# Patient Record
Sex: FEMALE | Race: WHITE | NOT HISPANIC OR LATINO
[De-identification: names, ages, dates, MRNs, and addresses within clinical notes are randomized per-mention and may not be internally consistent; named-entity substitution may affect disease eponyms.]

---

## 2020-11-11 ENCOUNTER — TRANSCRIPTION ENCOUNTER (OUTPATIENT)
Age: 50
End: 2020-11-11

## 2022-06-15 ENCOUNTER — OUTPATIENT (OUTPATIENT)
Dept: OUTPATIENT SERVICES | Facility: HOSPITAL | Age: 52
LOS: 1 days | End: 2022-06-15
Payer: COMMERCIAL

## 2022-06-15 VITALS — WEIGHT: 255.96 LBS | HEIGHT: 70 IN

## 2022-06-15 DIAGNOSIS — E66.01 MORBID (SEVERE) OBESITY DUE TO EXCESS CALORIES: ICD-10-CM

## 2022-06-15 DIAGNOSIS — Z98.890 OTHER SPECIFIED POSTPROCEDURAL STATES: Chronic | ICD-10-CM

## 2022-06-15 DIAGNOSIS — Z01.818 ENCOUNTER FOR OTHER PREPROCEDURAL EXAMINATION: ICD-10-CM

## 2022-06-15 DIAGNOSIS — G47.33 OBSTRUCTIVE SLEEP APNEA (ADULT) (PEDIATRIC): ICD-10-CM

## 2022-06-15 LAB
ANION GAP SERPL CALC-SCNC: 8 MMOL/L — SIGNIFICANT CHANGE UP (ref 5–17)
APPEARANCE UR: CLEAR — SIGNIFICANT CHANGE UP
APTT BLD: 33.5 SEC — SIGNIFICANT CHANGE UP (ref 27.5–35.5)
BASOPHILS # BLD AUTO: 0.08 K/UL — SIGNIFICANT CHANGE UP (ref 0–0.2)
BASOPHILS NFR BLD AUTO: 1 % — SIGNIFICANT CHANGE UP (ref 0–2)
BILIRUB UR-MCNC: NEGATIVE — SIGNIFICANT CHANGE UP
BLOOD GAS SOURCE: SIGNIFICANT CHANGE UP
BUN SERPL-MCNC: 13 MG/DL — SIGNIFICANT CHANGE UP (ref 7–23)
CALCIUM SERPL-MCNC: 9.9 MG/DL — SIGNIFICANT CHANGE UP (ref 8.5–10.1)
CHLORIDE SERPL-SCNC: 105 MMOL/L — SIGNIFICANT CHANGE UP (ref 96–108)
CO2 SERPL-SCNC: 26 MMOL/L — SIGNIFICANT CHANGE UP (ref 22–31)
COHGB MFR BLDV: 1.9 % — SIGNIFICANT CHANGE UP
COLOR SPEC: YELLOW — SIGNIFICANT CHANGE UP
CREAT SERPL-MCNC: 0.94 MG/DL — SIGNIFICANT CHANGE UP (ref 0.5–1.3)
DIFF PNL FLD: NEGATIVE — SIGNIFICANT CHANGE UP
EGFR: 73 ML/MIN/1.73M2 — SIGNIFICANT CHANGE UP
EOSINOPHIL # BLD AUTO: 0.36 K/UL — SIGNIFICANT CHANGE UP (ref 0–0.5)
EOSINOPHIL NFR BLD AUTO: 4.5 % — SIGNIFICANT CHANGE UP (ref 0–6)
GLUCOSE SERPL-MCNC: 91 MG/DL — SIGNIFICANT CHANGE UP (ref 70–99)
GLUCOSE UR QL: NEGATIVE — SIGNIFICANT CHANGE UP
HCT VFR BLD CALC: 43.5 % — SIGNIFICANT CHANGE UP (ref 34.5–45)
HGB BLD-MCNC: 14.5 G/DL — SIGNIFICANT CHANGE UP (ref 11.5–15.5)
IMM GRANULOCYTES NFR BLD AUTO: 0.2 % — SIGNIFICANT CHANGE UP (ref 0–1.5)
INR BLD: 1.14 RATIO — SIGNIFICANT CHANGE UP (ref 0.88–1.16)
KETONES UR-MCNC: ABNORMAL
LEUKOCYTE ESTERASE UR-ACNC: NEGATIVE — SIGNIFICANT CHANGE UP
LYMPHOCYTES # BLD AUTO: 2.1 K/UL — SIGNIFICANT CHANGE UP (ref 1–3.3)
LYMPHOCYTES # BLD AUTO: 26.1 % — SIGNIFICANT CHANGE UP (ref 13–44)
MCHC RBC-ENTMCNC: 29.2 PG — SIGNIFICANT CHANGE UP (ref 27–34)
MCHC RBC-ENTMCNC: 33.3 GM/DL — SIGNIFICANT CHANGE UP (ref 32–36)
MCV RBC AUTO: 87.7 FL — SIGNIFICANT CHANGE UP (ref 80–100)
MONOCYTES # BLD AUTO: 0.69 K/UL — SIGNIFICANT CHANGE UP (ref 0–0.9)
MONOCYTES NFR BLD AUTO: 8.6 % — SIGNIFICANT CHANGE UP (ref 2–14)
NEUTROPHILS # BLD AUTO: 4.81 K/UL — SIGNIFICANT CHANGE UP (ref 1.8–7.4)
NEUTROPHILS NFR BLD AUTO: 59.6 % — SIGNIFICANT CHANGE UP (ref 43–77)
NITRITE UR-MCNC: NEGATIVE — SIGNIFICANT CHANGE UP
PH UR: 6 — SIGNIFICANT CHANGE UP (ref 5–8)
PLATELET # BLD AUTO: 324 K/UL — SIGNIFICANT CHANGE UP (ref 150–400)
POTASSIUM SERPL-MCNC: 4.6 MMOL/L — SIGNIFICANT CHANGE UP (ref 3.5–5.3)
POTASSIUM SERPL-SCNC: 4.6 MMOL/L — SIGNIFICANT CHANGE UP (ref 3.5–5.3)
PROT UR-MCNC: NEGATIVE — SIGNIFICANT CHANGE UP
PROTHROM AB SERPL-ACNC: 13.3 SEC — SIGNIFICANT CHANGE UP (ref 10.5–13.4)
RBC # BLD: 4.96 M/UL — SIGNIFICANT CHANGE UP (ref 3.8–5.2)
RBC # FLD: 13.2 % — SIGNIFICANT CHANGE UP (ref 10.3–14.5)
SODIUM SERPL-SCNC: 139 MMOL/L — SIGNIFICANT CHANGE UP (ref 135–145)
SP GR SPEC: 1 — LOW (ref 1.01–1.02)
UROBILINOGEN FLD QL: NEGATIVE — SIGNIFICANT CHANGE UP
WBC # BLD: 8.06 K/UL — SIGNIFICANT CHANGE UP (ref 3.8–10.5)
WBC # FLD AUTO: 8.06 K/UL — SIGNIFICANT CHANGE UP (ref 3.8–10.5)

## 2022-06-15 PROCEDURE — 85025 COMPLETE CBC W/AUTO DIFF WBC: CPT

## 2022-06-15 PROCEDURE — 93010 ELECTROCARDIOGRAM REPORT: CPT

## 2022-06-15 PROCEDURE — G0463: CPT | Mod: 25

## 2022-06-15 PROCEDURE — 82375 ASSAY CARBOXYHB QUANT: CPT

## 2022-06-15 PROCEDURE — 80048 BASIC METABOLIC PNL TOTAL CA: CPT

## 2022-06-15 PROCEDURE — 36415 COLL VENOUS BLD VENIPUNCTURE: CPT

## 2022-06-15 PROCEDURE — 86901 BLOOD TYPING SEROLOGIC RH(D): CPT

## 2022-06-15 PROCEDURE — 71046 X-RAY EXAM CHEST 2 VIEWS: CPT

## 2022-06-15 PROCEDURE — 85730 THROMBOPLASTIN TIME PARTIAL: CPT

## 2022-06-15 PROCEDURE — 85610 PROTHROMBIN TIME: CPT

## 2022-06-15 PROCEDURE — 93005 ELECTROCARDIOGRAM TRACING: CPT

## 2022-06-15 PROCEDURE — 81003 URINALYSIS AUTO W/O SCOPE: CPT

## 2022-06-15 PROCEDURE — 86900 BLOOD TYPING SEROLOGIC ABO: CPT

## 2022-06-15 PROCEDURE — 82040 ASSAY OF SERUM ALBUMIN: CPT

## 2022-06-15 PROCEDURE — 86850 RBC ANTIBODY SCREEN: CPT

## 2022-06-15 PROCEDURE — 71046 X-RAY EXAM CHEST 2 VIEWS: CPT | Mod: 26

## 2022-06-15 NOTE — H&P PST ADULT - ASSESSMENT
53 y/o female presents to [facility] for scheduled laparoscopic gastric sleeve on 22 for weight loss.     CAPRINI SCORE    AGE RELATED RISK FACTORS                                                       MOBILITY RELATED FACTORS  [x ] Age 41-60 years                                            (1 Point)                  [ ] Bed rest                                                        (1 Point)  [ ] Age: 61-74 years                                           (2 Points)                [ ] Plaster cast                                                   (2 Points)  [ ] Age= 75 years                                              (3 Points)                 [ ] Bed bound for more than 72 hours                   (2 Points)    DISEASE RELATED RISK FACTORS                                               GENDER SPECIFIC FACTORS  [ ] Edema in the lower extremities                       (1 Point)                  [ ] Pregnancy                                                     (1 Point)  [ ] Varicose veins                                               (1 Point)                  [ ] Post-partum < 6 weeks                                   (1 Point)             [x ] BMI > 25 Kg/m2                                            (1 Point)                  [ ] Hormonal therapy  or oral contraception            (1 Point)                 [ ] Sepsis (in the previous month)                        (1 Point)                  [ ] History of pregnancy complications  [ ] Pneumonia or serious lung disease                                               [ ] Unexplained or recurrent                       (1 Point)           (in the previous month)                               (1 Point)  [ ] Abnormal pulmonary function test                     (1 Point)                 SURGERY RELATED RISK FACTORS  [ ] Acute myocardial infarction                              (1 Point)                 [ ]  Section                                            (1 Point)  [ ] Congestive heart failure (in the previous month)  (1 Point)                 [ ] Minor surgery                                                 (1 Point)   [ ] Inflammatory bowel disease                             (1 Point)                 [ ] Arthroscopic surgery                                        (2 Points)  [ ] Central venous access                                    (2 Points)                [x ] General surgery lasting more than 45 minutes   (2 Points)       [ ] Stroke (in the previous month)                          (5 Points)               [ ] Elective arthroplasty                                        (5 Points)            [ ] malignancy                                                             (2 points)                                                                                                                                 HEMATOLOGY RELATED FACTORS                                                 TRAUMA RELATED RISK FACTORS  [ ] Prior episodes of VTE                                     (3 Points)                 [ ] Fracture of the hip, pelvis, or leg                       (5 Points)  [ ] Positive family history for VTE                         (3 Points)                 [ ] Acute spinal cord injury (in the previous month)  (5 Points)  [ ] Prothrombin 26728 A                                      (3 Points)                 [ ] Paralysis  (less than 1 month)                          (5 Points)  [ ] Factor V Leiden                                             (3 Points)                 [ ] Multiple Trauma within 1 month                         (5 Points)  [ ] Lupus anticoagulants                                     (3 Points)                                                           [ ] Anticardiolipin antibodies                                (3 Points)                                                       [ ] High homocysteine in the blood                      (3 Points)                                             [ ] Other congenital or acquired thrombophilia       (3 Points)                                                [ ] Heparin induced thrombocytopenia                  (3 Points)                                          Total Score [       4   ]    The Caprini score indicates this patient is at risk for a VTE event (score 3-5).  Most surgical patients in this group would benefit from pharmacologic prophylaxis.  The surgical team will determine the balance between VTE risk and bleeding risk

## 2022-06-15 NOTE — H&P PST ADULT - NSICDXPASTMEDICALHX_GEN_ALL_CORE_FT
PAST MEDICAL HISTORY:  HLD (hyperlipidemia)     Hot flashes, menopausal on citalopram    Obesity, morbid     Severe obstructive sleep apnea

## 2022-06-15 NOTE — H&P PST ADULT - MUSCULOSKELETAL
normal/ROM intact/normal gait/strength 5/5 bilateral upper extremities/strength 5/5 bilateral lower extremities details…

## 2022-06-15 NOTE — H&P PST ADULT - PROBLEM SELECTOR PLAN 1
Pre op and chlorhexidine instructions given and explained.  Avoid NSAIDs and OTC supplements.   Patient verbalized understanding  medical consult requested by surgeon  covid 19 swab scheduled 6/17/22, advised to quarantine after test

## 2022-06-15 NOTE — H&P PST ADULT - WEIGHT IN KG
1445  12/12/18  Injection given without difficulties.Bandaid applied. Patient instructed to stay in the clinic for 15 minutes. Patient verbalized understanding and will notify nurse with any complaints.  
116.1

## 2022-06-15 NOTE — H&P PST ADULT - HISTORY OF PRESENT ILLNESS
51 y/o female presents to [facility] for scheduled laparoscopic gastric sleeve on 6/20/22 for weight loss.

## 2022-06-16 DIAGNOSIS — E66.01 MORBID (SEVERE) OBESITY DUE TO EXCESS CALORIES: ICD-10-CM

## 2022-06-16 DIAGNOSIS — Z01.818 ENCOUNTER FOR OTHER PREPROCEDURAL EXAMINATION: ICD-10-CM

## 2022-06-17 RX ORDER — ONDANSETRON 8 MG/1
4 TABLET, FILM COATED ORAL ONCE
Refills: 0 | Status: DISCONTINUED | OUTPATIENT
Start: 2022-06-20 | End: 2022-06-20

## 2022-06-17 RX ORDER — HYDROMORPHONE HYDROCHLORIDE 2 MG/ML
0.5 INJECTION INTRAMUSCULAR; INTRAVENOUS; SUBCUTANEOUS
Refills: 0 | Status: DISCONTINUED | OUTPATIENT
Start: 2022-06-20 | End: 2022-06-20

## 2022-06-17 RX ORDER — SODIUM CHLORIDE 9 MG/ML
1000 INJECTION, SOLUTION INTRAVENOUS
Refills: 0 | Status: DISCONTINUED | OUTPATIENT
Start: 2022-06-20 | End: 2022-06-20

## 2022-06-20 ENCOUNTER — INPATIENT (INPATIENT)
Facility: HOSPITAL | Age: 52
LOS: 0 days | Discharge: HOME | DRG: 621 | End: 2022-06-21
Attending: SURGERY | Admitting: SURGERY
Payer: COMMERCIAL

## 2022-06-20 ENCOUNTER — RESULT REVIEW (OUTPATIENT)
Age: 52
End: 2022-06-20

## 2022-06-20 VITALS
TEMPERATURE: 98 F | HEIGHT: 70 IN | WEIGHT: 255.96 LBS | OXYGEN SATURATION: 98 % | RESPIRATION RATE: 16 BRPM | DIASTOLIC BLOOD PRESSURE: 85 MMHG | SYSTOLIC BLOOD PRESSURE: 147 MMHG | HEART RATE: 74 BPM

## 2022-06-20 DIAGNOSIS — Z98.890 OTHER SPECIFIED POSTPROCEDURAL STATES: Chronic | ICD-10-CM

## 2022-06-20 DIAGNOSIS — E66.01 MORBID (SEVERE) OBESITY DUE TO EXCESS CALORIES: ICD-10-CM

## 2022-06-20 LAB — HCG UR QL: NEGATIVE — SIGNIFICANT CHANGE UP

## 2022-06-20 PROCEDURE — 36415 COLL VENOUS BLD VENIPUNCTURE: CPT

## 2022-06-20 PROCEDURE — 82962 GLUCOSE BLOOD TEST: CPT

## 2022-06-20 PROCEDURE — 88307 TISSUE EXAM BY PATHOLOGIST: CPT | Mod: 26

## 2022-06-20 PROCEDURE — 99252 IP/OBS CONSLTJ NEW/EST SF 35: CPT

## 2022-06-20 PROCEDURE — 81025 URINE PREGNANCY TEST: CPT

## 2022-06-20 PROCEDURE — C9113: CPT

## 2022-06-20 PROCEDURE — C1889: CPT

## 2022-06-20 PROCEDURE — 88307 TISSUE EXAM BY PATHOLOGIST: CPT

## 2022-06-20 PROCEDURE — 85025 COMPLETE CBC W/AUTO DIFF WBC: CPT

## 2022-06-20 PROCEDURE — 80048 BASIC METABOLIC PNL TOTAL CA: CPT

## 2022-06-20 RX ORDER — ACETAMINOPHEN 500 MG
1000 TABLET ORAL ONCE
Refills: 0 | Status: DISCONTINUED | OUTPATIENT
Start: 2022-06-20 | End: 2022-06-21

## 2022-06-20 RX ORDER — OXYCODONE HYDROCHLORIDE 5 MG/1
5 TABLET ORAL EVERY 4 HOURS
Refills: 0 | Status: DISCONTINUED | OUTPATIENT
Start: 2022-06-20 | End: 2022-06-21

## 2022-06-20 RX ORDER — ONDANSETRON 8 MG/1
4 TABLET, FILM COATED ORAL EVERY 6 HOURS
Refills: 0 | Status: DISCONTINUED | OUTPATIENT
Start: 2022-06-20 | End: 2022-06-21

## 2022-06-20 RX ORDER — ENOXAPARIN SODIUM 100 MG/ML
40 INJECTION SUBCUTANEOUS EVERY 12 HOURS
Refills: 0 | Status: DISCONTINUED | OUTPATIENT
Start: 2022-06-20 | End: 2022-06-21

## 2022-06-20 RX ORDER — ACETAMINOPHEN 500 MG
1000 TABLET ORAL ONCE
Refills: 0 | Status: COMPLETED | OUTPATIENT
Start: 2022-06-20 | End: 2022-06-20

## 2022-06-20 RX ORDER — CITALOPRAM 10 MG/1
1 TABLET, FILM COATED ORAL
Qty: 0 | Refills: 0 | DISCHARGE

## 2022-06-20 RX ORDER — SODIUM CHLORIDE 9 MG/ML
1000 INJECTION, SOLUTION INTRAVENOUS
Refills: 0 | Status: DISCONTINUED | OUTPATIENT
Start: 2022-06-20 | End: 2022-06-21

## 2022-06-20 RX ORDER — APREPITANT 80 MG/1
80 CAPSULE ORAL ONCE
Refills: 0 | Status: COMPLETED | OUTPATIENT
Start: 2022-06-20 | End: 2022-06-20

## 2022-06-20 RX ORDER — PANTOPRAZOLE SODIUM 20 MG/1
40 TABLET, DELAYED RELEASE ORAL EVERY 24 HOURS
Refills: 0 | Status: DISCONTINUED | OUTPATIENT
Start: 2022-06-20 | End: 2022-06-21

## 2022-06-20 RX ORDER — KETOROLAC TROMETHAMINE 30 MG/ML
30 SYRINGE (ML) INJECTION EVERY 6 HOURS
Refills: 0 | Status: DISCONTINUED | OUTPATIENT
Start: 2022-06-20 | End: 2022-06-21

## 2022-06-20 RX ORDER — OXYCODONE HYDROCHLORIDE 5 MG/1
10 TABLET ORAL EVERY 4 HOURS
Refills: 0 | Status: DISCONTINUED | OUTPATIENT
Start: 2022-06-20 | End: 2022-06-21

## 2022-06-20 RX ORDER — ATORVASTATIN CALCIUM 80 MG/1
1 TABLET, FILM COATED ORAL
Qty: 0 | Refills: 0 | DISCHARGE

## 2022-06-20 RX ORDER — HEPARIN SODIUM 5000 [USP'U]/ML
5000 INJECTION INTRAVENOUS; SUBCUTANEOUS ONCE
Refills: 0 | Status: COMPLETED | OUTPATIENT
Start: 2022-06-20 | End: 2022-06-20

## 2022-06-20 RX ADMIN — Medication 30 MILLIGRAM(S): at 18:22

## 2022-06-20 RX ADMIN — Medication 400 MILLIGRAM(S): at 23:11

## 2022-06-20 RX ADMIN — Medication 1000 MILLIGRAM(S): at 23:14

## 2022-06-20 RX ADMIN — Medication 0.5 MILLIGRAM(S): at 14:00

## 2022-06-20 RX ADMIN — HYDROMORPHONE HYDROCHLORIDE 0.5 MILLIGRAM(S): 2 INJECTION INTRAMUSCULAR; INTRAVENOUS; SUBCUTANEOUS at 14:50

## 2022-06-20 RX ADMIN — Medication 1000 MILLIGRAM(S): at 18:22

## 2022-06-20 RX ADMIN — HYDROMORPHONE HYDROCHLORIDE 0.5 MILLIGRAM(S): 2 INJECTION INTRAMUSCULAR; INTRAVENOUS; SUBCUTANEOUS at 16:39

## 2022-06-20 RX ADMIN — HYDROMORPHONE HYDROCHLORIDE 0.5 MILLIGRAM(S): 2 INJECTION INTRAMUSCULAR; INTRAVENOUS; SUBCUTANEOUS at 13:30

## 2022-06-20 RX ADMIN — Medication 30 MILLIGRAM(S): at 23:14

## 2022-06-20 RX ADMIN — HEPARIN SODIUM 5000 UNIT(S): 5000 INJECTION INTRAVENOUS; SUBCUTANEOUS at 10:01

## 2022-06-20 RX ADMIN — HYDROMORPHONE HYDROCHLORIDE 0.5 MILLIGRAM(S): 2 INJECTION INTRAMUSCULAR; INTRAVENOUS; SUBCUTANEOUS at 13:39

## 2022-06-20 RX ADMIN — HYDROMORPHONE HYDROCHLORIDE 0.5 MILLIGRAM(S): 2 INJECTION INTRAMUSCULAR; INTRAVENOUS; SUBCUTANEOUS at 13:50

## 2022-06-20 RX ADMIN — Medication 30 MILLIGRAM(S): at 23:11

## 2022-06-20 RX ADMIN — HYDROMORPHONE HYDROCHLORIDE 0.5 MILLIGRAM(S): 2 INJECTION INTRAMUSCULAR; INTRAVENOUS; SUBCUTANEOUS at 13:20

## 2022-06-20 RX ADMIN — Medication 1.25 MILLIGRAM(S): at 16:34

## 2022-06-20 RX ADMIN — HYDROMORPHONE HYDROCHLORIDE 0.5 MILLIGRAM(S): 2 INJECTION INTRAMUSCULAR; INTRAVENOUS; SUBCUTANEOUS at 13:40

## 2022-06-20 RX ADMIN — ENOXAPARIN SODIUM 40 MILLIGRAM(S): 100 INJECTION SUBCUTANEOUS at 21:25

## 2022-06-20 RX ADMIN — Medication 400 MILLIGRAM(S): at 18:22

## 2022-06-20 RX ADMIN — APREPITANT 80 MILLIGRAM(S): 80 CAPSULE ORAL at 10:01

## 2022-06-20 RX ADMIN — PANTOPRAZOLE SODIUM 40 MILLIGRAM(S): 20 TABLET, DELAYED RELEASE ORAL at 13:30

## 2022-06-20 NOTE — CONSULT NOTE ADULT - SUBJECTIVE AND OBJECTIVE BOX
PCP:   Dr. LUCRETIA Meyers    CHIEF COMPLAINT:  Morbid Obesity    HISTORY OF THE PRESENT ILLNESS: This a 53 yo female with PMH: migraine headaches, HLD, severe DARIEL, dx during Pulm clearance not yet on CPAP, diet controlled HTN and  morbid obesity, with BMI 36.7,  who failed diet, exercise and usual modalities for weight loss and has now opted for surgical management,  now S/P Gastric Sleeve.  Patient is seen in PACU, c/o mid epigastric pain, in NAD, denies any CP or SOB. We are consulted for Medical management    PAST MEDICAL HISTORY: as above    PAST SURGICAL HISTORY: bladder sling, endometrial ablation    FAMILY HISTORY:  Father: dec: age 80: lung disease, Mother dec: age 80; CHF, Lung Ca    SOCIAL HISTORY:  no smoking, social alcohol, no drugs    ALLERGIES: NKDA    HOME MEDS: see med rec    REVIEW OF SYSTEMS:   All 10 systems reviewed in detailed and found to be negative with the exception of what has already been described above    MEDICATIONS  (STANDING):  lactated ringers. 1000 milliLiter(s) (75 mL/Hr) IV Continuous <Continuous>  pantoprazole  Injectable 40 milliGRAM(s) IV Push every 24 hours    MEDICATIONS  (PRN):  HYDROmorphone  Injectable 0.5 milliGRAM(s) IV Push every 10 minutes PRN Moderate Pain (4 - 6)  LORazepam   Injectable 0.5 milliGRAM(s) IV Push every 6 hours PRN Anxiety  ondansetron Injectable 4 milliGRAM(s) IV Push once PRN Nausea and/or Vomiting      VITALS:  T(F): 97.3 (06-20-22 @ 13:15), Max: 97.5 (06-20-22 @ 09:46)  HR: 73 (06-20-22 @ 14:30) (66 - 78)  BP: 154/88 (06-20-22 @ 14:30) (138/75 - 158/82)  RR: 12 (06-20-22 @ 14:30) (10 - 16)  SpO2: 97% (06-20-22 @ 14:30) (97% - 99%)  Wt(kg): --    I&O's Summary      CAPILLARY BLOOD GLUCOSE      POCT Blood Glucose.: 116 mg/dL (20 Jun 2022 13:18)  POCT Blood Glucose.: 101 mg/dL (20 Jun 2022 09:42)    PHYSICAL EXAM:    GENERAL: Comfortable, no acute distress   HEAD:  Normocephalic, atraumatic  EYES: EOMI, PERRLA  HEENT: Moist mucous membranes  NECK: Supple, No JVD  NERVOUS SYSTEM:  Alert & Oriented X3, Motor Strength 5/5 B/L upper and lower extremities  CHEST/LUNG: Clear to auscultation bilaterally  HEART: Regular rate and rhythm  ABDOMEN: Soft, obese, Bowel sounds hypoactive, 6 lap sites c/di  GENITOURINARY: Voiding, no palpable bladder  EXTREMITIES:   No clubbing, cyanosis, or edema  MUSCULOSKELETAL- No muscle tenderness, no joint tenderness  SKIN-no rash    LABS: pending        IMPRESSION: 53 yo female with above pmh a/w:    #Morbid Obesity, BMI 36.7  # S/P gastric Sleeve  POD#0  pain control  IVF's  cl liqs per bariatric protocol  Monitor CBC/BMP  BGMs with SS  protonix IVP    # HTN  diet controlled  vasotec prn    # DARIEL, severe  not yet on CPAP  monitor O2 sats  supplement with O2 NC prn    #HLD/hot flashes/menopause  resume statin, citalopram when ok with surgery    #VTE prophylaxis  Lovenox  venodynes BLE  AMB    Thank you for the consult, will follow with you               PCP:   Dr. LUCRETIA Meyers    CHIEF COMPLAINT:  Morbid Obesity    HISTORY OF THE PRESENT ILLNESS: This a 53 yo female with PMH: migraine headaches, HLD, severe DARIEL, dx during Pulm clearance not yet on CPAP, diet controlled HTN and  obesity, with BMI 36.7,  who failed diet, exercise and usual modalities for weight loss and has now opted for surgical management,  now S/P Gastric Sleeve.  Patient is seen in PACU, c/o mid epigastric pain, in NAD, denies any CP or SOB. We are consulted for Medical management    PAST MEDICAL HISTORY: as above    PAST SURGICAL HISTORY: bladder sling, endometrial ablation    FAMILY HISTORY:  Father: dec: age 80: lung disease, Mother dec: age 80; CHF, Lung Ca    SOCIAL HISTORY:  no smoking, social alcohol, no drugs    ALLERGIES: NKDA    HOME MEDS: see med rec    REVIEW OF SYSTEMS:   All 10 systems reviewed in detailed and found to be negative with the exception of what has already been described above    MEDICATIONS  (STANDING):  lactated ringers. 1000 milliLiter(s) (75 mL/Hr) IV Continuous <Continuous>  pantoprazole  Injectable 40 milliGRAM(s) IV Push every 24 hours    MEDICATIONS  (PRN):  HYDROmorphone  Injectable 0.5 milliGRAM(s) IV Push every 10 minutes PRN Moderate Pain (4 - 6)  LORazepam   Injectable 0.5 milliGRAM(s) IV Push every 6 hours PRN Anxiety  ondansetron Injectable 4 milliGRAM(s) IV Push once PRN Nausea and/or Vomiting      VITALS:  T(F): 97.3 (06-20-22 @ 13:15), Max: 97.5 (06-20-22 @ 09:46)  HR: 73 (06-20-22 @ 14:30) (66 - 78)  BP: 154/88 (06-20-22 @ 14:30) (138/75 - 158/82)  RR: 12 (06-20-22 @ 14:30) (10 - 16)  SpO2: 97% (06-20-22 @ 14:30) (97% - 99%)    PHYSICAL EXAM:    GENERAL: Comfortable, no acute distress   HEAD:  Normocephalic, atraumatic  EYES: EOMI, PERRLA  HEENT: Moist mucous membranes  NECK: Supple, No JVD  NERVOUS SYSTEM:  Alert & Oriented X3, Motor Strength 5/5 B/L upper and lower extremities  CHEST/LUNG: Clear to auscultation bilaterally  HEART: Regular rate and rhythm  ABDOMEN: Soft, obese, Bowel sounds hypoactive, 6 lap sites c/di  GENITOURINARY: Voiding, no palpable bladder  EXTREMITIES:   No clubbing, cyanosis, or edema  MUSCULOSKELETAL- No muscle tenderness, no joint tenderness  SKIN-no rash    LABS: pending        IMPRESSION: 53 yo female with above pmh a/w:    #Morbid Obesity, BMI 36.7  # S/P gastric Sleeve  POD#0  pain control  IVF's  cl liqs per bariatric protocol  Monitor CBC/BMP  BGMs with SS  protonix IVP    # HTN  diet controlled  vasotec prn    # DARIEL, severe  not yet on CPAP  monitor O2 sats  supplement with O2 NC prn    #HLD/hot flashes/menopause  resume statin, citalopram when ok with surgery    #VTE prophylaxis  Lovenox  venodynes BLE  AMB    Thank you for the consult, will follow with you

## 2022-06-20 NOTE — BRIEF OPERATIVE NOTE - OPERATION/FINDINGS
Patient underwent laparoscopic sleeve gastrectomy over 40 Khmer bougie without any complications. Intraoperative EGD confirmed intact nonbleeding staple line with negative leak test.

## 2022-06-20 NOTE — BRIEF OPERATIVE NOTE - NSICDXBRIEFPROCEDURE_GEN_ALL_CORE_FT
PROCEDURES:  Laparoscopic sleeve gastrectomy 20-Jun-2022 13:18:49  Nan Chu, transversus abdominis plane, bilateral 20-Jun-2022 13:18:52  Nan Chu  EGD, intraoperative 20-Jun-2022 13:18:57  Nan Chu

## 2022-06-21 ENCOUNTER — TRANSCRIPTION ENCOUNTER (OUTPATIENT)
Age: 52
End: 2022-06-21

## 2022-06-21 VITALS
HEART RATE: 59 BPM | DIASTOLIC BLOOD PRESSURE: 54 MMHG | OXYGEN SATURATION: 96 % | SYSTOLIC BLOOD PRESSURE: 120 MMHG | TEMPERATURE: 98 F | RESPIRATION RATE: 16 BRPM

## 2022-06-21 DIAGNOSIS — E66.01 MORBID (SEVERE) OBESITY DUE TO EXCESS CALORIES: ICD-10-CM

## 2022-06-21 LAB
ANION GAP SERPL CALC-SCNC: 6 MMOL/L — SIGNIFICANT CHANGE UP (ref 5–17)
BASOPHILS # BLD AUTO: 0.04 K/UL — SIGNIFICANT CHANGE UP (ref 0–0.2)
BASOPHILS NFR BLD AUTO: 0.6 % — SIGNIFICANT CHANGE UP (ref 0–2)
BUN SERPL-MCNC: 6 MG/DL — LOW (ref 7–23)
CALCIUM SERPL-MCNC: 8.5 MG/DL — SIGNIFICANT CHANGE UP (ref 8.5–10.1)
CHLORIDE SERPL-SCNC: 108 MMOL/L — SIGNIFICANT CHANGE UP (ref 96–108)
CO2 SERPL-SCNC: 25 MMOL/L — SIGNIFICANT CHANGE UP (ref 22–31)
CREAT SERPL-MCNC: 0.61 MG/DL — SIGNIFICANT CHANGE UP (ref 0.5–1.3)
EGFR: 108 ML/MIN/1.73M2 — SIGNIFICANT CHANGE UP
EOSINOPHIL # BLD AUTO: 0.08 K/UL — SIGNIFICANT CHANGE UP (ref 0–0.5)
EOSINOPHIL NFR BLD AUTO: 1.2 % — SIGNIFICANT CHANGE UP (ref 0–6)
GLUCOSE SERPL-MCNC: 93 MG/DL — SIGNIFICANT CHANGE UP (ref 70–99)
HCT VFR BLD CALC: 37.8 % — SIGNIFICANT CHANGE UP (ref 34.5–45)
HGB BLD-MCNC: 12.2 G/DL — SIGNIFICANT CHANGE UP (ref 11.5–15.5)
IMM GRANULOCYTES NFR BLD AUTO: 0.3 % — SIGNIFICANT CHANGE UP (ref 0–1.5)
LYMPHOCYTES # BLD AUTO: 1.76 K/UL — SIGNIFICANT CHANGE UP (ref 1–3.3)
LYMPHOCYTES # BLD AUTO: 25.8 % — SIGNIFICANT CHANGE UP (ref 13–44)
MCHC RBC-ENTMCNC: 28.8 PG — SIGNIFICANT CHANGE UP (ref 27–34)
MCHC RBC-ENTMCNC: 32.3 GM/DL — SIGNIFICANT CHANGE UP (ref 32–36)
MCV RBC AUTO: 89.4 FL — SIGNIFICANT CHANGE UP (ref 80–100)
MONOCYTES # BLD AUTO: 0.68 K/UL — SIGNIFICANT CHANGE UP (ref 0–0.9)
MONOCYTES NFR BLD AUTO: 10 % — SIGNIFICANT CHANGE UP (ref 2–14)
NEUTROPHILS # BLD AUTO: 4.24 K/UL — SIGNIFICANT CHANGE UP (ref 1.8–7.4)
NEUTROPHILS NFR BLD AUTO: 62.1 % — SIGNIFICANT CHANGE UP (ref 43–77)
PLATELET # BLD AUTO: 251 K/UL — SIGNIFICANT CHANGE UP (ref 150–400)
POTASSIUM SERPL-MCNC: 3.4 MMOL/L — LOW (ref 3.5–5.3)
POTASSIUM SERPL-SCNC: 3.4 MMOL/L — LOW (ref 3.5–5.3)
RBC # BLD: 4.23 M/UL — SIGNIFICANT CHANGE UP (ref 3.8–5.2)
RBC # FLD: 13.2 % — SIGNIFICANT CHANGE UP (ref 10.3–14.5)
SODIUM SERPL-SCNC: 139 MMOL/L — SIGNIFICANT CHANGE UP (ref 135–145)
WBC # BLD: 6.82 K/UL — SIGNIFICANT CHANGE UP (ref 3.8–10.5)
WBC # FLD AUTO: 6.82 K/UL — SIGNIFICANT CHANGE UP (ref 3.8–10.5)

## 2022-06-21 PROCEDURE — 99232 SBSQ HOSP IP/OBS MODERATE 35: CPT

## 2022-06-21 RX ORDER — ACETAMINOPHEN 500 MG
1000 TABLET ORAL ONCE
Refills: 0 | Status: COMPLETED | OUTPATIENT
Start: 2022-06-21 | End: 2022-06-21

## 2022-06-21 RX ADMIN — ENOXAPARIN SODIUM 40 MILLIGRAM(S): 100 INJECTION SUBCUTANEOUS at 11:06

## 2022-06-21 RX ADMIN — Medication 30 MILLIGRAM(S): at 05:15

## 2022-06-21 RX ADMIN — ONDANSETRON 4 MILLIGRAM(S): 8 TABLET, FILM COATED ORAL at 11:08

## 2022-06-21 RX ADMIN — OXYCODONE HYDROCHLORIDE 5 MILLIGRAM(S): 5 TABLET ORAL at 00:57

## 2022-06-21 RX ADMIN — Medication 30 MILLIGRAM(S): at 11:20

## 2022-06-21 RX ADMIN — Medication 1000 MILLIGRAM(S): at 05:15

## 2022-06-21 RX ADMIN — Medication 400 MILLIGRAM(S): at 05:15

## 2022-06-21 RX ADMIN — PANTOPRAZOLE SODIUM 40 MILLIGRAM(S): 20 TABLET, DELAYED RELEASE ORAL at 11:07

## 2022-06-21 RX ADMIN — OXYCODONE HYDROCHLORIDE 5 MILLIGRAM(S): 5 TABLET ORAL at 01:30

## 2022-06-21 RX ADMIN — Medication 30 MILLIGRAM(S): at 11:07

## 2022-06-21 NOTE — PROGRESS NOTE ADULT - PROBLEM SELECTOR PLAN 1
The patient is s/p lap sleeve gastrectomy and doing very well.  All discharge instructions were given to the patient, as well as potential signs of complications.  The patient will follow up in 2 weeks.  [facility]

## 2022-06-21 NOTE — DISCHARGE NOTE PROVIDER - CARE PROVIDER_API CALL
Leonides Rojas)  Surgery  [address], [address]  [place] [address]  Phone: [phone]  Fax: [phone]  Follow Up Time: 2 weeks

## 2022-06-21 NOTE — DISCHARGE NOTE PROVIDER - NSDCMRMEDTOKEN_GEN_ALL_CORE_FT
atorvastatin 10 mg oral tablet: 1 tab(s) orally once a day  citalopram 40 mg oral tablet: 1 tab(s) orally once a day

## 2022-06-21 NOTE — DISCHARGE NOTE NURSING/CASE MANAGEMENT/SOCIAL WORK - NSDCPEFALRISK_GEN_ALL_CORE
For information on Fall & Injury Prevention, visit: [email or web address] OR  [email or web address] OR  https://www.cdc.gov/steadi/patient.html

## 2022-06-21 NOTE — DISCHARGE NOTE NURSING/CASE MANAGEMENT/SOCIAL WORK - PATIENT PORTAL LINK FT
You can access the FollowMyHealth Patient Portal offered by [facility] by registering at the following website: [email or web address]. By joining [facility]’s FollowMyHealth portal, you will also be able to view your health information using other applications (apps) compatible with our system.

## 2022-06-21 NOTE — DISCHARGE NOTE PROVIDER - HOSPITAL COURSE
Patient is an 53 yo F that underwent laparoscopic sleeve gastrectomy without any complications. Patient is currently doing very well, pain controlled, and is tolerating phase I bariatric diet. Patient has had uncomplicated hospital course and is stable for discharge home with follow-up in the office in 2 weeks.

## 2022-06-21 NOTE — PROGRESS NOTE ADULT - SUBJECTIVE AND OBJECTIVE BOX
Post Op Day#: 1  Procedure:  Laparoscopic Sleeve Gastrectomy    The patient is doing well without complaints.    Vital Signs Last 24 Hrs  T(C): 36.7 (21 Jun 2022 08:46), Max: 37.2 (20 Jun 2022 22:07)  T(F): 98.1 (21 Jun 2022 08:46), Max: 98.9 (20 Jun 2022 22:07)  HR: 59 (21 Jun 2022 08:46) (59 - 89)  BP: 120/54 (21 Jun 2022 08:46) (120/54 - 159/89)  BP(mean): 96 (20 Jun 2022 22:07) (96 - 96)  RR: 16 (21 Jun 2022 08:46) (10 - 17)  SpO2: 96% (21 Jun 2022 08:46) (96% - 99%)    PHYSICAL EXAM:  General: NAD.  HEENT: no JVD, no jaundice.  LUNGS: CTAB.  Heart: S1 S2 RRR  Abd: soft nt/nd   Wounds: clean dry and intact                          12.2   6.82  )-----------( 251      ( 21 Jun 2022 08:29 )             37.8       06-21    139  |  108  |  6<L>  ----------------------------<  93  3.4<L>   |  25  |  0.61    Ca    8.5      21 Jun 2022 08:29          
  PCP:   Dr. LUCRETIA Meyers    C/c:  Morbid Obesity    HPI: 51 yo female with PMH migraine headaches, HLD, severe DARIEL, dx during Pulm clearance not yet on CPAP, diet controlled HTN and  obesity, with BMI 36.7,  who failed diet, exercise and usual modalities for weight loss and has now opted for surgical management,  now S/P Gastric Sleeve.    Hospitalist service consulted for medical comanagement.     pt seen and examined this am. doing well. pain controlled. no sob/chest pain. Tolerating fe clears. No n/v. +flatus. No difficulty voiding.    REVIEW OF SYSTEMS:   All 10 systems reviewed in detailed and found to be negative with the exception of what has already been described above    Vital Signs Last 24 Hrs  T(C): 36.7 (21 Jun 2022 08:46), Max: 37.2 (20 Jun 2022 22:07)  T(F): 98.1 (21 Jun 2022 08:46), Max: 98.9 (20 Jun 2022 22:07)  HR: 59 (21 Jun 2022 08:46) (59 - 89)  BP: 120/54 (21 Jun 2022 08:46) (120/54 - 159/89)  BP(mean): 96 (20 Jun 2022 22:07) (96 - 96)  RR: 16 (21 Jun 2022 08:46) (10 - 17)  SpO2: 96% (21 Jun 2022 08:46) (96% - 99%)    PHYSICAL EXAM:    GENERAL: Comfortable, no acute distress   HEAD:  Normocephalic, atraumatic  EYES: EOMI, PERRLA  HEENT: Moist mucous membranes  NECK: Supple, No JVD  NERVOUS SYSTEM:  Alert & Oriented X3, Motor Strength 5/5 B/L upper and lower extremities  CHEST/LUNG: Clear to auscultation bilaterally  HEART: Regular rate and rhythm  ABDOMEN: Soft, obese, NT, Bowel sounds +, 6 lap sites c/di  GENITOURINARY: Voiding, no palpable bladder  EXTREMITIES:   No clubbing, cyanosis, or edema  MUSCULOSKELETAL- No muscle tenderness, no joint tenderness  SKIN-no rash    LABS:                        12.2   6.82  )-----------( 251      ( 21 Jun 2022 08:29 )             37.8     06-21    139  |  108  |  6<L>  ----------------------------<  93  3.4<L>   |  25  |  0.61    Ca    8.5      21 Jun 2022 08:29        MEDICATIONS  (STANDING):  acetaminophen   IVPB .. 1000 milliGRAM(s) IV Intermittent once  enoxaparin Injectable 40 milliGRAM(s) SubCutaneous every 12 hours  lactated ringers. 1000 milliLiter(s) (150 mL/Hr) IV Continuous <Continuous>  ondansetron Injectable 4 milliGRAM(s) IV Push every 6 hours  pantoprazole  Injectable 40 milliGRAM(s) IV Push every 24 hours    MEDICATIONS  (PRN):  enalaprilat Injectable 1.25 milliGRAM(s) IV Push every 6 hours PRN b/p > 160  LORazepam   Injectable 0.5 milliGRAM(s) IV Push every 6 hours PRN Anxiety  oxyCODONE    Solution 5 milliGRAM(s) Oral every 4 hours PRN Mild Pain (1 - 3)  oxyCODONE    Solution 10 milliGRAM(s) Oral every 4 hours PRN Moderate Pain (4 - 6)        ASSESSMENT AND PLAN:   51 yo female with above pmh a/w:    #Morbid Obesity, BMI 36.7  # S/P gastric Sleeve  -POD#1  -IVF  -PPI  -fe clears per protocol.  -incentive spirometry.  -ambualte.    # HTN  diet controlled  vasotec prn    # DARIEL, severe  not yet on CPAP  monitor O2 sats  supplement with O2 NC prn    #hypokalemia:  -replete    #HLD/hot flashes/menopause  resume statin, citalopram when ok with surgery    #VTE prophylaxis  Lovenox  venodynes BLE  AMB

## 2022-06-24 DIAGNOSIS — E66.01 MORBID (SEVERE) OBESITY DUE TO EXCESS CALORIES: ICD-10-CM

## 2022-06-24 DIAGNOSIS — G47.33 OBSTRUCTIVE SLEEP APNEA (ADULT) (PEDIATRIC): ICD-10-CM

## 2022-06-24 DIAGNOSIS — E78.5 HYPERLIPIDEMIA, UNSPECIFIED: ICD-10-CM

## 2022-09-05 NOTE — CHART NOTE - NSCHARTNOTEFT_GEN_A_CORE
Surgery date: 6-20-22    Pre-Operative Diagnosis: Refractory morbid obesity with multiple comorbid conditions.    Post-operative Diagnosis: Same    Primary Procedure  [33840] Lap Longitudinal Gastrectomy     Secondary Procedure(s)  [28629] Bilateral TAP Block  [11846] Uppr Gi Endoscopy, Diagnosis     Surgeon(s)  Surgeon:  Leonides Rojas MD  Assistant surgeon: Sharif Romero MD    Anesthesia type: General Anesthesia     Specimens:  partial gastrectomy sent to pathology     Estimated blood loss: 30 ml     DRAINS: NONE    COMPLICATIONS: NONE     INDICATIONS OF THE PROCEDURE: The patient is a 52-year-old female who is morbidly obese with a body mass index of 40. The patient has multiple comorbidities due to her morbid obesity including hyperlipidemia. The patient has failed multiple nonoperative attempts at weight loss. The patient meets NIH criteria for bariatric surgery and underwent appropriate preoperative workup, education, and signed the consent form freely. The patient had risks and benefits explained including, but not limited to, bleeding, leak, infection, disability, injury to transient structures, aspiration, DVT, pulmonary embolism, and death. The patient understood and agreed to proceed with surgery.     DESCRIPTION OF PROCEDURE: The patient was identified properly via a time-out. The patient was brought into the operating room and was placed on the operating room table in supine position. The patient was then given general endotracheal anesthesia and was given preoperative antibiotics. Preoperative heparin was given in the ambulatory surgery unit. The patient was then prepped and draped in the usual sterile fashion. An Exparel mixture was mixed at the back table with 20 mL of Exparel, 30 mL of 0.25% Marcaine and 150 mL of normal saline. A Veress needle was placed in the left upper quadrant. The abdomen was insufflated to 15 millimeters of mercury. Once the abdomen was insufflated, the Exparel mixture was given subcutaneously at the sites of incision. An incision was made within the umbilicus and a 12-millimeter trocar was placed in the abdomen. The laparoscope was inserted and there was no injury on initial trocar placement or initial Veress needle placement. A Transversus Abdominus Plane Block was then conducted by placing 20 mL of the Exparel mixture preperitoneal at the distribution of the spinal nerves on the lateral abdominal wall. This was started at the costal margin at the mid axillary line. 20cc of the Exparel mixture was placed in this area. Another 20 mL was placed four centimeters caudal to this area. Another 20 mL was placed four centimeters caudal to this area and another 15 mL was placed four centimeters caudal to this area. This was repeated on the opposite side of the body in a similar fashion. The rest of the Exparel was placed into the subcutaneous tissues and preperitoneal at every trocar site. A 5-millimeter and 15-millimeter trocar was placed in the right upper quadrant. A 12-millimeter and 5-millimeter trocar was placed in left upper quadrant. An incision was made in subxiphoid area and a liver retractor was placed to hold the liver anteriorly and superiorly and was held in place using Mediflex device. The patient was then placed into steep reverse Trendelenburg position and a point along the stomach was then measured approximately 5 centimeters proximal to the pylorus and the greater curvature of the stomach was taken down from that point. The greater curvature was taken down all the way to the angle of His and the stomach was removed from the left crura using the Ligasure device. At this point, the stomach was completely mobilized. A 40-Nigerien bougie was then placed into the stomach and placed into the antrum. At this point a 60 millimeter iDrive stapler with a tri-staple reinforced black load was used to start transecting the stomach along the bougie, approximately 5 more firings using the reinforced purple load were used to staple the stomach to wrap around the bougie to make a nice sleeve around the bougie. Once the stomach was completely transected, the stomach was placed into an EndoCatch bag and removed from the abdomen.   Two sutures, 3-0 sutures were placed in the antrum of the stomach to the omental fat so that the sleeve would not rotate. Hemostasis was achieved, the remaining stomach was placed under saline solution and an endoscopy was then conducted. There was no leak from the staple line. There was no bleeding or stricture at the staple line. Once the endoscopy was completed, complete hemostasis was assured. The 15-millimeter trocar was then removed and the fascia was closed with a 0 Vicryl suture using the suture passer. Upon completion of the procedure, the abdomen was desufflated and all trocars were removed. The skin was closed with 4-0 Monocryl running subcuticular sutures. The patient tolerated the procedure well.    Disposition  To recovery room, VS stable.

## 2023-09-03 ENCOUNTER — NON-APPOINTMENT (OUTPATIENT)
Age: 53
End: 2023-09-03

## 2024-01-18 PROBLEM — Z00.00 ENCOUNTER FOR PREVENTIVE HEALTH EXAMINATION: Status: ACTIVE | Noted: 2024-01-18

## 2024-01-19 PROBLEM — E78.5 HYPERLIPIDEMIA, UNSPECIFIED: Chronic | Status: ACTIVE | Noted: 2022-06-15

## 2024-01-19 PROBLEM — E66.01 MORBID (SEVERE) OBESITY DUE TO EXCESS CALORIES: Chronic | Status: ACTIVE | Noted: 2022-06-15

## 2024-01-19 PROBLEM — N95.1 MENOPAUSAL AND FEMALE CLIMACTERIC STATES: Chronic | Status: ACTIVE | Noted: 2022-06-15

## 2024-01-19 PROBLEM — G47.33 OBSTRUCTIVE SLEEP APNEA (ADULT) (PEDIATRIC): Chronic | Status: ACTIVE | Noted: 2022-06-15

## 2024-01-23 ENCOUNTER — APPOINTMENT (OUTPATIENT)
Dept: ORTHOPEDIC SURGERY | Facility: CLINIC | Age: 54
End: 2024-01-23
Payer: OTHER MISCELLANEOUS

## 2024-01-23 VITALS — HEIGHT: 70 IN | WEIGHT: 175 LBS | BODY MASS INDEX: 25.05 KG/M2

## 2024-01-23 DIAGNOSIS — Z78.9 OTHER SPECIFIED HEALTH STATUS: ICD-10-CM

## 2024-01-23 DIAGNOSIS — S62.357A NONDISPLACED FRACTURE OF SHAFT OF FIFTH METACARPAL BONE, LEFT HAND, INITIAL ENCOUNTER FOR CLOSED FRACTURE: ICD-10-CM

## 2024-01-23 PROCEDURE — 99204 OFFICE O/P NEW MOD 45 MIN: CPT | Mod: 57

## 2024-01-23 PROCEDURE — 26600 TREAT METACARPAL FRACTURE: CPT | Mod: LT

## 2024-01-23 NOTE — IMAGING
[Left] : left hand [The fracture is in acceptable alignment. There is progression in healing seen] : The fracture is in acceptable alignment. There is progression in healing seen [FreeTextEntry1] : non-displaced 5th metacarpal shaft fx

## 2024-01-23 NOTE — HISTORY OF PRESENT ILLNESS
[Work related] : work related [Dull/Aching] : dull/aching [Localized] : localized [de-identified] : 54 year old female fell at work 5 days ago.  She used her LEFT  hand top break her fall. Noted pain immediately in her LEFT little finger.  Was seen at urgent care, where xrays were taken and she was splinted.  Was also seen by ortho, day after injury.   [] : no [FreeTextEntry1] : LFT hand, LFT pinky [FreeTextEntry3] : 01/18/2024 [FreeTextEntry5] : 54 yr old female states she slipped and fell 5 days ago injuring her LFT pinky. States she received prior treatment from [facility], [facility].

## 2024-01-30 ENCOUNTER — APPOINTMENT (OUTPATIENT)
Dept: ORTHOPEDIC SURGERY | Facility: CLINIC | Age: 54
End: 2024-01-30
Payer: OTHER MISCELLANEOUS

## 2024-01-30 PROCEDURE — 73130 X-RAY EXAM OF HAND: CPT | Mod: LT

## 2024-01-30 PROCEDURE — 99024 POSTOP FOLLOW-UP VISIT: CPT

## 2024-01-30 NOTE — PHYSICAL EXAM
[Left] : left hand [The fracture is in acceptable alignment. There is progression in healing seen] : The fracture is in acceptable alignment. There is progression in healing seen

## 2024-01-30 NOTE — DISCUSSION/SUMMARY
[de-identified] : Discussed the nature of the diagnosis and risk and benefits of different modalities of treatment. Repeat x-rays reviewed. Continue to splint. RTO 3 weeks.

## 2024-01-30 NOTE — HISTORY OF PRESENT ILLNESS
[Work related] : work related [Dull/Aching] : dull/aching [Localized] : localized [de-identified] : 54 year  old female followed for a LT fifth metacarpal fx. She has been splinting.   DOI: 1/18/24  [] : no [FreeTextEntry1] : LFT hand, LFT pinky [FreeTextEntry3] : 01/18/2024 [FreeTextEntry5] : pain has improved slightly since last office visit

## 2024-02-20 ENCOUNTER — APPOINTMENT (OUTPATIENT)
Dept: ORTHOPEDIC SURGERY | Facility: CLINIC | Age: 54
End: 2024-02-20
Payer: OTHER MISCELLANEOUS

## 2024-02-20 ENCOUNTER — TRANSCRIPTION ENCOUNTER (OUTPATIENT)
Age: 54
End: 2024-02-20

## 2024-02-20 DIAGNOSIS — S62.357D NONDISPLACED FRACTURE OF SHAFT OF FIFTH METACARPAL BONE, LEFT HAND, SUBSEQUENT ENCOUNTER FOR FRACTURE WITH ROUTINE HEALING: ICD-10-CM

## 2024-02-20 PROCEDURE — 73130 X-RAY EXAM OF HAND: CPT | Mod: LT

## 2024-02-20 PROCEDURE — 99024 POSTOP FOLLOW-UP VISIT: CPT

## 2024-02-20 NOTE — HISTORY OF PRESENT ILLNESS
[Work related] : work related [Dull/Aching] : dull/aching [Localized] : localized [de-identified] : 54 year old female followed for a LT fifth metacarpal fx. She has been splinting.  DOI: 1/18/24 [] : no [FreeTextEntry1] : LFT hand, LFT pinky [FreeTextEntry3] : 01/18/2024 [FreeTextEntry5] : pain has improved slightly since last office visit

## 2024-02-20 NOTE — DISCUSSION/SUMMARY
[de-identified] : Discussed the nature of the diagnosis and risk and benefits of different modalities of treatment. Repeat x-rays reviewed and discussed.  Start OT.  No heavy lifting.  RTO 3 weeks.

## 2024-03-12 ENCOUNTER — APPOINTMENT (OUTPATIENT)
Dept: ORTHOPEDIC SURGERY | Facility: CLINIC | Age: 54
End: 2024-03-12

## 2025-01-11 ENCOUNTER — NON-APPOINTMENT (OUTPATIENT)
Age: 55
End: 2025-01-11